# Patient Record
Sex: FEMALE | Race: WHITE | Employment: UNEMPLOYED | URBAN - METROPOLITAN AREA
[De-identification: names, ages, dates, MRNs, and addresses within clinical notes are randomized per-mention and may not be internally consistent; named-entity substitution may affect disease eponyms.]

---

## 2020-11-17 ENCOUNTER — HOSPITAL ENCOUNTER (OUTPATIENT)
Dept: PHYSICAL THERAPY | Age: 70
Setting detail: THERAPIES SERIES
Discharge: HOME | End: 2020-11-17
Payer: MEDICARE

## 2020-11-17 PROCEDURE — 20560 NDL INSJ W/O NJX 1 OR 2 MUSC: CPT

## 2020-11-17 PROCEDURE — 97530 THERAPEUTIC ACTIVITIES: CPT

## 2020-11-17 PROCEDURE — 97110 THERAPEUTIC EXERCISES: CPT

## 2020-11-17 PROCEDURE — 97162 PT EVAL MOD COMPLEX 30 MIN: CPT

## 2020-11-17 ASSESSMENT — PAIN - FUNCTIONAL ASSESSMENT: PAIN_FUNCTIONAL_ASSESSMENT: PREVENTS OR INTERFERES SOME ACTIVE ACTIVITIES AND ADLS

## 2020-11-17 ASSESSMENT — PAIN DESCRIPTION - FREQUENCY: FREQUENCY: CONTINUOUS

## 2020-11-17 ASSESSMENT — PAIN DESCRIPTION - PAIN TYPE: TYPE: CHRONIC PAIN;ACUTE PAIN

## 2020-11-17 ASSESSMENT — PAIN DESCRIPTION - PROGRESSION: CLINICAL_PROGRESSION: GRADUALLY IMPROVING

## 2020-11-17 ASSESSMENT — PAIN SCALES - GENERAL: PAINLEVEL_OUTOF10: 4

## 2020-11-17 ASSESSMENT — PAIN DESCRIPTION - LOCATION: LOCATION: BACK

## 2020-11-17 ASSESSMENT — PAIN DESCRIPTION - ORIENTATION: ORIENTATION: LEFT

## 2020-11-17 ASSESSMENT — PAIN DESCRIPTION - DESCRIPTORS: DESCRIPTORS: RADIATING;SHARP;BURNING

## 2020-11-17 NOTE — PROGRESS NOTES
Physical Therapy  Initial Assessment  Date: 2020  Patient Name: Prince Jansen  MRN: [id number]  : 1950     Treatment Diagnosis: radicular pain left low back to ankle, palpable tenderness left piriformis, impaired daily function, LLE weakness    Restrictions  Position Activity Restriction  Other position/activity restrictions: No formal restrictions    Subjective   General  Chart Reviewed: Yes  Patient assessed for rehabilitation services?: Yes  Additional Pertinent Hx: PMH:  R RC repair, R breast CA w/mastectomy, OA, HLP, L ankle fx w/fixation, chronic back pain  Family / Caregiver Present: Yes  Referring Practitioner: Milind Adair (Dr. Maria Hogan)  Diagnosis: Sciatica  Follows Commands: Within Functional Limits  PT Visit Information  Onset Date: (3-4 weeks acute pain; hx sciatica)  PT Insurance Information: [facility] Medicare  Total # of Visits Approved: (BOMN)  Subjective  Subjective: Pt reports acute on chronic LBP w/significant onset of pain down the left leg to the ankle. X-ray on 20 (+) for DDD of lumbar spine, facet overgrowth mostly in lumbar spine, mild right convex scoliotic curve. PLOF:  Ind. w/mobility and self-care, retired from working at [employer], [email or web address], does laundry and makes meals. Has a service for yard work. Spouse and patient share household duties. Was given a muscle relaxer and Medrol dose pack w/o improvement. She also has been going to have medical massage w/some relief. WORSE:  In the evening, standing to do chores, walking, sleeping (has to sleep on her back), stairs  BETTER:  Heat/cold temporarily  Pain Screening  Patient Currently in Pain: Yes  Pain Assessment  Pain Assessment: 0-10  Pain Level: 4  Pain Type: Chronic pain;Acute pain  Pain Location: Back  Pain Orientation: Left  Pain Radiating Towards: low back to ankle posterolateral leg  Pain Descriptors: Radiating; Sharp;Burning(burning at the ankle)  Pain Frequency: Continuous  Pain Onset: Independent  Quality of Gait: mildly antalgic, no foot drop  Distance: 100 ft x 2     Assessment   Conditions Requiring Skilled Therapeutic Intervention  Body structures, Functions, Activity limitations: Decreased functional mobility ; Decreased strength; Increased pain;Decreased posture  Assessment: Patient is a 65 y.o. female w/DX sciatica. Pt reports acute on chronic LBP w/significant onset of pain down the left leg to the ankle. X-ray on 11/2/20 (+) for DDD of lumbar spine, facet overgrowth mostly in lumbar spine, mild right convex scoliotic curve. PLOF:  Ind. w/mobility and self-care, retired from working at [employer], [employer], does laundry and makes meals. Has a service for yard work. Spouse and patient share household duties. Was given a muscle relaxer and Medrol dose pack w/o improvement. She also has been going to have medical massage w/some relief. WORSE:  In the evening, standing to do chores, walking, sleeping (has to sleep on her back), stairs  BETTER:  Heat/cold temporarily  Treatment Diagnosis: radicular pain left low back to ankle, palpable tenderness left piriformis, impaired daily function, LLE weakness  Prognosis: Good  Decision Making: Medium Complexity  History: PMH:  R RC repair, R breast CA w/mastectomy, OA, HLP, L ankle fx w/fixation, chronic back pain  Exam: MMT, ROM, palpation, gait  Clinical Presentation: Med complexity  Barriers to Learning: None noted  REQUIRES PT FOLLOW UP: Yes         Plan   Plan  Times per week: 2  Plan weeks: 4  Current Treatment Recommendations: Strengthening, Neuromuscular Re-education, Manual Therapy - Soft Tissue Mobilization, Pain Management, Modalities, Patient/Caregiver Education & Training, Home Exercise Program, Integrated Dry Needling         Goals  Long term goals  Time Frame for Long term goals : In 4 weeks, patient will  Long term goal 1: demonstrate compliance and independence w/HEP.   Long term goal 2: score no greater than 20% disability on Oswestry LBP questionnaire as an indication of significant functional improvement w/daily activities. Long term goal 3: report ability to sleep as long as desired through the night w/o waking d/t back/leg pain. Patient Goals   Patient goals : No longer have sciatic pain, be able to sleep.        Therapy Time   Individual Concurrent Group Co-treatment   Time In 1500         Time Out 1600         Minutes 60         Timed Code Treatment Minutes: MiguelaFederico Verma 98, PT

## 2020-11-17 NOTE — FLOWSHEET NOTE
Outpatient Physical Therapy  [place]           [x] Phone: [phone]   Fax: [phone]  [place]           [] Phone: [phone]   Fax: [phone]        Physical Therapy Daily Treatment Note  Date:  2020    Patient Name:  Yeni Lopez    :  1950  MRN: [id number]  Restrictions/Precautions: Other position/activity restrictions: No formal restrictions  Diagnosis:   Diagnosis: Sciatica  Date of Injury/Surgery: acute x 3 wks on chronic  Treatment Diagnosis: Treatment Diagnosis: radicular pain left low back to ankle, palpable tenderness left piriformis, impaired daily function, LLE weakness    Insurance/Certification information: PT Insurance Information: [facility] Medicare   Referring Physician:  Referring Practitioner: Denise Escalona (Dr. Milly Vaz)  Next Doctor Visit:  Unknown   Plan of care signed (Y/N):  Pending  Outcome Measure: OSW  (54% disability)  Visit# / total visits:   1/  Pain level: 4/10 w/symptoms from LLB to left ankle  POC Date Range:  20 - 20   Goals:          Long term goals  Time Frame for Long term goals : In 4 weeks, patient will  Long term goal 1: demonstrate compliance and independence w/HEP. Long term goal 2: score no greater than 20% disability on Oswestry LBP questionnaire as an indication of significant functional improvement w/daily activities. Long term goal 3: report ability to sleep as long as desired through the night w/o waking d/t back/leg pain. Summary of Evaluation: Assessment: Patient is a 65 y.o. female w/DX sciatica. Pt reports acute on chronic LBP w/significant onset of pain down the left leg to the ankle. X-ray on 20 (+) for DDD of lumbar spine, facet overgrowth mostly in lumbar spine, mild right convex scoliotic curve. PLOF:  Ind. w/mobility and self-care, retired from working at [employer], Currently, does laundry and makes meals. Has a service for yard work. Spouse and patient share household duties.   Was given a muscle relaxer and Medrol dose pack w/o improvement. She also has been going to have medical massage w/some relief. WORSE:  In the evening, standing to do chores, walking, sleeping (has to sleep on her back), stairs  BETTER:  Heat/cold temporarily        Subjective:  See eval         Any changes in Ambulatory Summary Sheet? None        Objective:  See eval     Prior to today's treatment session, patient was screened for signs and symptoms related to COVID-19 including but not limited to verbally answering questions related to feeling ill, cough, or SOB, along with taking temperature via forehead thermometer. Patient presented with all negative signs and symptoms and had no fever >100 degrees Fahrenheit this date. Exercises: (No more than 4 columns)   Exercise/Equipment Date 11/17/20 #1 Date Date           WARM UP                     TABLE      Piriformis st 1 x 20 sec  bilat                                STANDING                                                     PROPRIOCEPTION                                    MODALITIES DNT                     Other Therapeutic Activities/Education:    POC and treatment progression expected reviewed with and accepted by patient. DNT Acknowledgement form was provided, reviewed and signed. Pt notified that mild needle soreness x 1-2 days may be present and is an expected side effect. Mild bruising at needle site(s) may also occur. Patient wished to proceed w/DNT. Home Exercise Program:    TBD    Manual Treatments:    NA    Modalities:    DNT  Right side lying w/2 pillows b/t knees  Left piriformis  0.30 x 40 mm    Communication with other providers:    POC faxed to ordering provider. Assessment:  (Response towards treatment session) (Pain Rating)  Pt tolerated DNT very well. No adverse reactions. \"Burning\" left ankle resolved. Assessment: Patient is a 65 y.o. female w/DX sciatica. Pt reports acute on chronic LBP w/significant onset of pain down the left leg to the ankle. X-ray on 11/2/20 (+) for DDD of lumbar spine, facet overgrowth mostly in lumbar spine, mild right convex scoliotic curve. PLOF:  Ind. w/mobility and self-care, retired from working at [employer], [employer], does laundry and makes meals. Has a service for yard work. Spouse and patient share household duties. Was given a muscle relaxer and Medrol dose pack w/o improvement. She also has been going to have medical massage w/some relief.   WORSE:  In the evening, standing to do chores, walking, sleeping (has to sleep on her back), stairs  BETTER:  Heat/cold temporarily      Plan for Next Session:     Lumbar stabilization  Piriformis stretch as gwen  DNT if tolerated and beneficial previous session    Time In / Time Out:     1500/1600    If [facility] Please Indicate Time In/Out  CPT Code Time in Time out                                                              Timed Code/Total Treatment Minutes:  45'/60'  TE 15' (1), ADL 15' (1), DNT 13' (1)      Next Progress Note due:  78/86/29 (Recert VS Discharge)      Plan of Care Interventions:  [x] Therapeutic Exercise  [x] Modalities:  [x] Therapeutic Activity     [x] Ultrasound  [x] Estim  [] Gait Training      [] Cervical Traction [x] Lumbar Traction  [x] Neuromuscular Re-education    [x] Cold/hotpack [] Iontophoresis   [x] Instruction in HEP      [] Vasopneumatic   [x] Dry Needling    [x] Manual Therapy               [] Aquatic Therapy              Electronically signed by:  Joy Dang, PT 11/17/2020, 6:27 PM

## 2020-11-19 ENCOUNTER — HOSPITAL ENCOUNTER (OUTPATIENT)
Dept: PHYSICAL THERAPY | Age: 70
Setting detail: THERAPIES SERIES
Discharge: HOME | End: 2020-11-19
Payer: MEDICARE

## 2020-11-19 PROCEDURE — 20561 NDL INSJ W/O NJX 3+ MUSC: CPT

## 2020-11-19 NOTE — FLOWSHEET NOTE
Outpatient Physical Therapy  Center           [x] Phone: [phone]   Fax: [phone]  [place]           [] Phone: [phone]   Fax: [phone]        Physical Therapy Daily Treatment Note  Date:  2020    Patient Name:  Chaz Luong    :  1950  MRN: [id number]  Restrictions/Precautions: Other position/activity restrictions: No formal restrictions  Diagnosis:   Diagnosis: Sciatica  Date of Injury/Surgery: acute x 3 wks on chronic  Treatment Diagnosis: Treatment Diagnosis: radicular pain left low back to ankle, palpable tenderness left piriformis, impaired daily function, LLE weakness    Insurance/Certification information: PT Insurance Information: [place] Medicare   Referring Physician:  Referring Practitioner: Flora Guillen (Dr. Tracy Allison)  Next Doctor Visit:  Unknown   Plan of care signed (Y/N):  Pending  Outcome Measure: OSW  (54% disability)  Visit# / total visits:   2/  Pain level: 8/10 left upper buttock, 0/10 low back, 8/10 w/symptoms    POC Date Range:  20 - 20     Goals:          Long term goals  Time Frame for Long term goals : In 4 weeks, patient will  Long term goal 1: demonstrate compliance and independence w/HEP. Long term goal 2: score no greater than 20% disability on Oswestry LBP questionnaire as an indication of significant functional improvement w/daily activities. Long term goal 3: report ability to sleep as long as desired through the night w/o waking d/t back/leg pain. Summary of Evaluation: Assessment: Patient is a 65 y.o. female w/DX sciatica. Pt reports acute on chronic LBP w/significant onset of pain down the left leg to the ankle. X-ray on 20 (+) for DDD of lumbar spine, facet overgrowth mostly in lumbar spine, mild right convex scoliotic curve. PLOF:  Ind. w/mobility and self-care, retired from working at [employer], [employer], does laundry and makes meals. Has a service for yard work.   Spouse and patient share household duties. Was given a muscle relaxer and Medrol dose pack w/o improvement. She also has been going to have medical massage w/some relief. WORSE:  In the evening, standing to do chores, walking, sleeping (has to sleep on her back), stairs  BETTER:  Heat/cold temporarily        Subjective: After last treatment, left ankle pain/burning has not been as intense. Today, however, symptoms have increased. Patient states she was very busy w/housework yesterday w/bending to clean toilets/tubs and laundry. [facility] would like to repeat the dry needling. Any changes in Ambulatory Summary Sheet? None        Objective:     Gait and transfers non-antalgic w/o AD. TTP L lumbar paraspinals, L piriformis, gluteus medius, distal gluteus max, superior hamstrings (biceps femoris)  Radicular symptoms from left lumbar paraspinals to left lateral ankle. Prior to today's treatment session, patient was screened for signs and symptoms related to COVID-19 including but not limited to verbally answering questions related to feeling ill, cough, or SOB, along with taking temperature via forehead thermometer. Patient presented with all negative signs and symptoms and had no fever >100 degrees Fahrenheit this date. Exercises: (No more than 4 columns)   Exercise/Equipment Date 11/17/20 #1 Date 11/19/20 #2 Date           WARM UP                     TABLE      Piriformis st 1 x 20 sec  bilat                                STANDING                                                     PROPRIOCEPTION                                    MODALITIES DNT DNT                    Other Therapeutic Activities/Education:    POC and treatment progression expected reviewed with and accepted by patient. DNT Acknowledgement form was provided, reviewed and signed. Pt notified that mild needle soreness x 1-2 days may be present and is an expected side effect. Mild bruising at needle site(s) may also occur.   Patient wished to proceed w/DNT. Home Exercise Program:    TBD    Manual Treatments:    NA    Modalities:    11/19/20:  DNT  Right side lying w/2 pillows b/t knees    Left lumbar paraspinals  L3, L4, L5, S1  0.25 x 30 mm    Left piriformis  0.30 x 40 mm    Left lateral lower leg (peroneal)  0.25 x 30 mm    Communication with other providers:    POC faxed to ordering provider. Assessment:  (Response towards treatment session) (Pain Rating)  Pt tolerated DNT very well. No adverse reactions. Needle soreness reported. Gait non-antalgic    Assessment: Patient is a 65 y.o. female w/DX sciatica. Pt reports acute on chronic LBP w/significant onset of pain down the left leg to the ankle. X-ray on 11/2/20 (+) for DDD of lumbar spine, facet overgrowth mostly in lumbar spine, mild right convex scoliotic curve. PLOF:  Ind. w/mobility and self-care, retired from working at [employer], [employer], does laundry and makes meals. Has a service for yard work. Spouse and patient share household duties. Was given a muscle relaxer and Medrol dose pack w/o improvement. She also has been going to have medical massage w/some relief.   WORSE:  In the evening, standing to do chores, walking, sleeping (has to sleep on her back), stairs  BETTER:  Heat/cold temporarily      Plan for Next Session:     Lumbar stabilization  Piriformis stretch as gwen  DNT if tolerated and beneficial previous session    Time In / Time Out:     1200/1240    If [facility] Please Indicate Time In/Out  CPT Code Time in Time out                                                              Timed Code/Total Treatment Minutes:  40'/40'   DNT 40' (1)      Next Progress Note due:  89/56/44 (Recert VS Discharge)      Plan of Care Interventions:  [x] Therapeutic Exercise  [x] Modalities:  [x] Therapeutic Activity     [x] Ultrasound  [x] Estim  [] Gait Training      [] Cervical Traction [x] Lumbar Traction  [x] Neuromuscular Re-education    [x] Cold/hotpack [] Iontophoresis   [x] Instruction in HEP      [] Vasopneumatic   [x] Dry Needling    [x] Manual Therapy               [] Aquatic Therapy              Electronically signed by:  Rekha Richardson PT 11/19/2020, 12:01 PM

## 2020-11-24 ENCOUNTER — HOSPITAL ENCOUNTER (OUTPATIENT)
Dept: PHYSICAL THERAPY | Age: 70
Setting detail: THERAPIES SERIES
Discharge: HOME | End: 2020-11-24
Payer: MEDICARE

## 2020-11-24 PROCEDURE — 20561 NDL INSJ W/O NJX 3+ MUSC: CPT

## 2020-12-01 ENCOUNTER — HOSPITAL ENCOUNTER (OUTPATIENT)
Dept: PHYSICAL THERAPY | Age: 70
Setting detail: THERAPIES SERIES
Discharge: HOME | End: 2020-12-01
Payer: MEDICARE

## 2020-12-01 PROCEDURE — 97530 THERAPEUTIC ACTIVITIES: CPT

## 2020-12-01 PROCEDURE — 97112 NEUROMUSCULAR REEDUCATION: CPT

## 2020-12-01 PROCEDURE — 97110 THERAPEUTIC EXERCISES: CPT

## 2020-12-01 PROCEDURE — 97140 MANUAL THERAPY 1/> REGIONS: CPT

## 2020-12-01 NOTE — FLOWSHEET NOTE
[facility]  [facility]           [x] Phone: [phone]   Fax: [phone]  [place]           [] Phone: [phone]   Fax: [phone]        Physical Therapy Daily Treatment Note  Date:  2020    Patient Name:  Gunjan Parker    :  1950  MRN: [id number]  Restrictions/Precautions: Other position/activity restrictions: No formal restrictions  Diagnosis:   Diagnosis: Sciatica  Date of Injury/Surgery: acute x 3 wks on chronic  Treatment Diagnosis: Treatment Diagnosis: radicular pain left low back to ankle, palpable tenderness left piriformis, impaired daily function, LLE weakness    Insurance/Certification information: PT Insurance Information: [place] [facility]   Referring Physician:  Referring Practitioner: Jossue David (Dr. Kamryn Mock)  Next Doctor Visit:  Unknown   Plan of care signed (Y/N):  Pending  Outcome Measure: OSW  (54% disability)  Visit# / total visits:   4/  Pain level: 5/10 left upper buttock, 5/10 L low back, 5/10 left lateral ankle    POC Date Range:  20 - 20     Goals:          Long term goals  Time Frame for Long term goals : In 4 weeks, patient will  Long term goal 1: demonstrate compliance and independence w/HEP. Long term goal 2: score no greater than 20% disability on Oswestry LBP questionnaire as an indication of significant functional improvement w/daily activities. Long term goal 3: report ability to sleep as long as desired through the night w/o waking d/t back/leg pain. Summary of Evaluation: Assessment: Patient is a 65 y.o. female w/DX sciatica. Pt reports acute on chronic LBP w/significant onset of pain down the left leg to the ankle. X-ray on 20 (+) for DDD of lumbar spine, facet overgrowth mostly in lumbar spine, mild right convex scoliotic curve. PLOF:  Ind. w/mobility and self-care, retired from working at [employer], [employer], does laundry and makes meals. Has a service for yard work.   Spouse and patient share household duties. Was given a muscle relaxer and Medrol dose pack w/o improvement. She also has been going to have medical massage w/some relief. WORSE:  In the evening, standing to do chores, walking, sleeping (has to sleep on her back), stairs  BETTER:  Heat/cold temporarily        Subjective: Pt stated she went on a 1 mile walk yesterday and walk [place] mall. Her back was okay. Any changes in Ambulatory Summary Sheet? None        Objective: At arrival, antalgic and R LE glut weakness w trendelenburg like gait  Decreased tolerance to R LE SL activities  Difficulty describing intensity, rating pain and location when asked. Generalized 5/10   7/10 SL L LE  L posterior pelvic rotation with L LE post standing activity  R LB pain with SL WB  Challenged by side abduction and side stepping with weakness and pain         Prior to today's treatment session, patient was screened for signs and symptoms related to COVID-19 including but not limited to verbally answering questions related to feeling ill, cough, or SOB, along with taking temperature via forehead thermometer. Patient presented with all negative signs and symptoms and had no fever >100 degrees Fahrenheit this date. Exercises: (No more than 4 columns)   Exercise/Equipment Date 11/17/20 #1 Date 11/19/20 #2 Date 11/24/20 #3 12/1/20 #4            WARM UP          Nustep    lv 4 6'   S/A 10          TABLE       Piriformis st 1 x 20 sec  bilat      PPT    X 15 2 ct   DKTC w SB    X 10   SKTC w TA contraction    X 15          Bridges with add ball    X 10   Clams #1    X 10   X 10 RTB                    STANDING              abduction    2 x 5 ea.   Standing on L LE increased pain to 7/10           STS    X 10  Arms crossed                              PROPRIOCEPTION       airex marches       airex side stepping                            MODALITIES DNT DNT DNT                      Other Therapeutic Activities/Education:      Home Exercise Program:    12/1/20 TA, sup amandeep, hector #1, bridges, STS HO Given    Manual Treatments:    12/1/20 MET pelvic rotation correction x 2: before standing exercises with positive decrease in pain and after standing exercises with generalized LBP with muscle fatigue. Modalities:    11/24/20:  DNT  Right side lying w/2 pillows b/t knees    Left lumbar paraspinals  L3, L4, L5, S1  0.25 x 30 mm    Left piriformis  0.30 x 40 mm    Left lateral lower leg (peroneal)  0.25 x 30 mm    Communication with other providers:    POC faxed to ordering provider. Assessment:  (Response towards treatment session) (Pain Rating)  Pt demonstrated GOOD tolerance to mat exercises and FAIR tolerance to standing exercises especially L LE SL WB with noticeable hip drop. Pt would continue to benefit from skilled therapy interventions to address remaining impairments, improve mobility and strength and progress toward goal completion while reducing risk for re-injury or further decline. Pain: same, 5/10, LB/sacral area but feeling better and possible LB fatigue    Assessment: Patient is a 65 y.o. female w/DX sciatica. Pt reports acute on chronic LBP w/significant onset of pain down the left leg to the ankle. X-ray on 11/2/20 (+) for DDD of lumbar spine, facet overgrowth mostly in lumbar spine, mild right convex scoliotic curve. PLOF:  Ind. w/mobility and self-care, retired from working at [employer], [employer], does laundry and makes meals. Has a service for yard work. Spouse and patient share household duties. Was given a muscle relaxer and Medrol dose pack w/o improvement. She also has been going to have medical massage w/some relief.   WORSE:  In the evening, standing to do chores, walking, sleeping (has to sleep on her back), stairs  BETTER:  Heat/cold temporarily      Plan for Next Session:     Lumbar stabilization  Hip strengthening  Piriformis stretch as gwen      Time In / Time Out:     1210/12    Timed Code/Total Treatment Minutes:  57'/ 20 TE 17 NR 10 MT 10 TA      Next Progress Note due:  11/69/13 (Recert VS Discharge)      Plan of Care Interventions:  [x] Therapeutic Exercise  [x] Modalities:  [x] Therapeutic Activity     [x] Ultrasound  [x] Estim  [] Gait Training      [] Cervical Traction [x] Lumbar Traction  [x] Neuromuscular Re-education    [x] Cold/hotpack [] Iontophoresis   [x] Instruction in HEP      [] Vasopneumatic   [x] Dry Needling    [x] Manual Therapy               [] Aquatic Therapy              Electronically signed by:  Angélica Caal PTA, CLT 12/1/2020, 1:05 PM

## 2020-12-03 ENCOUNTER — HOSPITAL ENCOUNTER (OUTPATIENT)
Dept: PHYSICAL THERAPY | Age: 70
Setting detail: THERAPIES SERIES
Discharge: HOME | End: 2020-12-03
Payer: MEDICARE

## 2020-12-03 PROCEDURE — 97112 NEUROMUSCULAR REEDUCATION: CPT

## 2020-12-03 PROCEDURE — 97110 THERAPEUTIC EXERCISES: CPT

## 2020-12-03 PROCEDURE — 97530 THERAPEUTIC ACTIVITIES: CPT

## 2020-12-03 NOTE — FLOWSHEET NOTE
Outpatient Physical Therapy  [place]           [x] Phone: [phone]   Fax: [phone]  [facility]           [] Phone: [phone]   Fax: [phone]        Physical Therapy Daily Treatment Note  Date:  12/3/2020    Patient Name:  Joel Mart    :  1950  MRN: [id number]  Restrictions/Precautions: Other position/activity restrictions: No formal restrictions  Diagnosis:   Diagnosis: Sciatica  Date of Injury/Surgery: acute x 3 wks on chronic  Treatment Diagnosis: Treatment Diagnosis: radicular pain left low back to ankle, palpable tenderness left piriformis, impaired daily function, LLE weakness    Insurance/Certification information: PT Insurance Information: [facility] Medicare   Referring Physician:  Referring Practitioner: Kerline Oliveira (Dr. Bailey Garcia)  Next Doctor Visit:  Unknown   Plan of care signed (Y/N):  Pending  Outcome Measure: OSW  (54% disability)  Visit# / total visits:   5/  Pain level: 2/10 currently  Last night>>5/10 left upper buttock, 5/10 L low back, 5/10 left lateral ankle    POC Date Range:  20 - 20     Goals:          Long term goals  Time Frame for Long term goals : In 4 weeks, patient will  Long term goal 1: demonstrate compliance and independence w/HEP. Long term goal 2: score no greater than 20% disability on Oswestry LBP questionnaire as an indication of significant functional improvement w/daily activities. Long term goal 3: report ability to sleep as long as desired through the night w/o waking d/t back/leg pain. Summary of Evaluation: Assessment: Patient is a 65 y.o. female w/DX sciatica. Pt reports acute on chronic LBP w/significant onset of pain down the left leg to the ankle. X-ray on 20 (+) for DDD of lumbar spine, facet overgrowth mostly in lumbar spine, mild right convex scoliotic curve. PLOF:  Ind. w/mobility and self-care, retired from working at [employer], [employer], does laundry and makes meals.    Has a service for yard work.  Spouse and patient share household duties. Was given a muscle relaxer and Medrol dose pack w/o improvement. She also has been going to have medical massage w/some relief. WORSE:  In the evening, standing to do chores, walking, sleeping (has to sleep on her back), stairs  BETTER:  Heat/cold temporarily        Subjective: Pt stated she went grocery shopping and followed with shopping. Came home and cooked dinner and did laundry. Total 4-5 hours    Any changes in Ambulatory Summary Sheet? None      Objective: At arrival, mild antalgic 2/10   Decreased tolerance to L LE SL activities but improved since last visit  5/10 at end of long day walking. No pelvic rotation today  Mild R LB pain with SL WB  Challenged by side abduction and side stepping with weakness on L side; trendelenburg sx    Prior to today's treatment session, patient was screened for signs and symptoms related to COVID-19 including but not limited to verbally answering questions related to feeling ill, cough, or SOB, along with taking temperature via forehead thermometer. Patient presented with all negative signs and symptoms and had no fever >100 degrees Fahrenheit this date. Exercises: (No more than 4 columns)   Exercise/Equipment Date 11/17/20 #1 Date 11/19/20 #2 Date 11/24/20 #3 12/1/20 #4 12/3/20 #5             WARM UP           Nustep    lv 4 6'   S/A 10            TABLE        Piriformis st 1 x 20 sec  bilat       PPT    X 15 2 ct X 20 5 ct   DKTC w SB    X 10 X 20   SKTC w TA contraction    X 15 X 15           Bridges with add ball    X 10 X 15   Clams #1    X 10   X 10 RTB X 10 GTB ea. STANDING                abduction    2 x 5 ea. Standing on L LE increased pain to 7/10  2 x 5 ea.   Standing on L LE increased            STS    X 10  Arms crossed  X 15  Arms crossed                                 PROPRIOCEPTION        airex marches     X 10   side stepping     X 10   Wobble board f/b s/s     Tap x 10 ea.  and balancing 30 s x 2                   MODALITIES DNT DNT DNT  x                       Other Therapeutic Activities/Education:      Home Exercise Program:    12/1/20 TA, sup marches, clams #1, bridges, STS HO Given    Manual Treatments:        Modalities:        Communication with other providers:    POC faxed to ordering provider. Assessment:  (Response towards treatment session) (Pain Rating)  Pt demonstrated GOOD tolerance to mat exercises and  GOOD- tolerance to standing exercises. L LE SL WB with noticeable hip drop with less pain today. Pt would continue to benefit from skilled therapy interventions to address remaining impairments, improve mobility and strength and progress toward goal completion while reducing risk for re-injury or further decline. Pain: 2/10     Assessment: Patient is a 65 y.o. female w/DX sciatica. Pt reports acute on chronic LBP w/significant onset of pain down the left leg to the ankle. X-ray on 11/2/20 (+) for DDD of lumbar spine, facet overgrowth mostly in lumbar spine, mild right convex scoliotic curve. PLOF:  Ind. w/mobility and self-care, retired from working at [employer], [employer], does laundry and makes meals. Has a service for yard work. Spouse and patient share household duties. Was given a muscle relaxer and Medrol dose pack w/o improvement. She also has been going to have medical massage w/some relief.   WORSE:  In the evening, standing to do chores, walking, sleeping (has to sleep on her back), stairs  BETTER:  Heat/cold temporarily      Plan for Next Session:     Lumbar stabilization  Hip strengthening  Piriformis stretch as gwen      Time In / Time Out:     1119/1157    Timed Code/Total Treatment Minutes:  38'/ 15 TE 15 NR 8 TA      Next Progress Note due:  31/41/03 (Recert VS Discharge)      Plan of Care Interventions:  [x] Therapeutic Exercise  [x] Modalities:  [x] Therapeutic Activity     [x] Ultrasound  [x] Estim  [] Gait Training [] Cervical Traction [x] Lumbar Traction  [x] Neuromuscular Re-education    [x] Cold/hotpack [] Iontophoresis   [x] Instruction in HEP      [] Vasopneumatic   [x] Dry Needling    [x] Manual Therapy               [] Aquatic Therapy              Electronically signed by:  Ivonne Greene PTA, CLT 12/3/2020, 11:22 AM

## 2020-12-08 ENCOUNTER — HOSPITAL ENCOUNTER (OUTPATIENT)
Dept: PHYSICAL THERAPY | Age: 70
Setting detail: THERAPIES SERIES
Discharge: HOME | End: 2020-12-08
Payer: MEDICARE

## 2020-12-08 PROCEDURE — 97530 THERAPEUTIC ACTIVITIES: CPT

## 2020-12-08 PROCEDURE — 97110 THERAPEUTIC EXERCISES: CPT

## 2020-12-08 PROCEDURE — 97112 NEUROMUSCULAR REEDUCATION: CPT

## 2020-12-08 NOTE — FLOWSHEET NOTE
Outpatient Physical Therapy  [place]           [x] Phone: [phone]   Fax: [phone]  [facility]           [] Phone: [phone]   Fax: [phone]        Physical Therapy Daily Treatment Note  Date:  2020    Patient Name:  Jerardo Bishop    :  1950  MRN: [id number]  Restrictions/Precautions: Other position/activity restrictions: No formal restrictions  Diagnosis:   Diagnosis: Sciatica  Date of Injury/Surgery: acute x 3 wks on chronic  Treatment Diagnosis: Treatment Diagnosis: radicular pain left low back to ankle, palpable tenderness left piriformis, impaired daily function, LLE weakness    Insurance/Certification information: PT Insurance Information: [facility] Medicare   Referring Physician:  Referring Practitioner: Domenica Álvarez (Dr. Kierra Manzanares)  Next Doctor Visit:  Unknown   Plan of care signed (Y/N):  Pending  Outcome Measure: OSW  (54% disability)  Visit# / total visits:   6/  Pain level: 0/10 currently     POC Date Range:  20 - 20     Goals:          Long term goals  Time Frame for Long term goals : In 4 weeks, patient will  Long term goal 1: demonstrate compliance and independence w/HEP. Long term goal 2: score no greater than 20% disability on Oswestry LBP questionnaire as an indication of significant functional improvement w/daily activities. Long term goal 3: report ability to sleep as long as desired through the night w/o waking d/t back/leg pain. Summary of Evaluation: Assessment: Patient is a 65 y.o. female w/DX sciatica. Pt reports acute on chronic LBP w/significant onset of pain down the left leg to the ankle. X-ray on 20 (+) for DDD of lumbar spine, facet overgrowth mostly in lumbar spine, mild right convex scoliotic curve. PLOF:  Ind. w/mobility and self-care, retired from working at [employer], [employer], does laundry and makes meals. Has a service for yard work. Spouse and patient share household duties.   Was given a muscle relaxer and Medrol dose pack w/o improvement. She also has been going to have medical massage w/some relief. WORSE:  In the evening, standing to do chores, walking, sleeping (has to sleep on her back), stairs  BETTER:  Heat/cold temporarily        Subjective: Pt stated she did get up from a chair and felt a pinch in the back for a second but it didn't stay. Currently she has no pain. She did have have a tooth pulled yesterday. She went shopping Saturday 3-4/10    Any changes in Ambulatory Summary Sheet? None      Objective: Increased tolerance to SLS activities  5/10 at end of walking/shopping. L posterior pelvic rotation today. Corrected with MET  No c/o pain  Challenged by side abduction and side stepping the most and balance board with weakness on L>R side    Prior to today's treatment session, patient was screened for signs and symptoms related to COVID-19 including but not limited to verbally answering questions related to feeling ill, cough, or SOB, along with taking temperature via forehead thermometer. Patient presented with all negative signs and symptoms and had no fever >100 degrees Fahrenheit this date. Exercises: (No more than 4 columns)   Exercise/Equipment 12/1/20 #4 12/3/20 #5 12/8/20 #6           WARM UP         Nustep lv 4 6'   S/A 10  lv 4 7'   S/A 10         TABLE      Piriformis st      PPT X 15 2 ct X 20 5 ct X 20 5 ct   DKTC w SB X 10 X 20 X 20   SKTC w TA contraction X 15 X 15 X 20         Bridges with add ball X 10 X 15 X 15   Clams #1 X 10   X 10 RTB X 10 GTB ea. STANDING            abduction 2 x 5 ea. Standing on L LE increased pain to 7/10  2 x 5 ea. Standing on L LE increased  2 x 5 ea. Standing on L LE increased          STS X 10  Arms crossed  X 15  Arms crossed  X 15  Arms crossed                           PROPRIOCEPTION      airex marches  X 10 X 10   side stepping  X 10 X 10   Wobble board f/b s/s  Tap x 10 ea. and balancing 30 s x 2 Tap x 10 ea.   and balancing 30 s x 2               MODALITIES  x x                   Other Therapeutic Activities/Education:      Home Exercise Program:    12/1/20 TA, sup marches, clams #1, bridges, STS HO Given    Manual Treatments:    12/8/20 MET pelvic rotation correction x 2: before standing exercises with positive decrease in pain and after standing exercises with generalized LBP with muscle fatigue. Modalities:  x    Communication with other providers:    POC faxed to ordering provider. Assessment:  (Response towards treatment session) (Pain Rating)  Pt demonstrated GOOD tolerance to standing exercises. L LE SL WB with mild hip drop and instability with no pain today. Mild increased in abdominal control. Pt would continue to benefit from skilled therapy interventions to address remaining impairments, improve mobility and strength and progress toward goal completion while reducing risk for re-injury or further decline. Pain: 0/10 mild fatigue     Assessment: Patient is a 65 y.o. female w/DX sciatica. Pt reports acute on chronic LBP w/significant onset of pain down the left leg to the ankle. X-ray on 11/2/20 (+) for DDD of lumbar spine, facet overgrowth mostly in lumbar spine, mild right convex scoliotic curve. PLOF:  Ind. w/mobility and self-care, retired from working at [employer], [employer], does laundry and makes meals. Has a service for yard work. Spouse and patient share household duties. Was given a muscle relaxer and Medrol dose pack w/o improvement. She also has been going to have medical massage w/some relief.   WORSE:  In the evening, standing to do chores, walking, sleeping (has to sleep on her back), stairs  BETTER:  Heat/cold temporarily      Plan for Next Session:     Lumbar stabilization  Hip strengthening  Piriformis stretch as gwen      Time In / Time Out:     1131 (late)/1209    Timed Code/Total Treatment Minutes:  38'/ 15 TE 15 NR 8 TA      Next Progress Note due:  31/08/28 (Recert

## 2020-12-10 ENCOUNTER — HOSPITAL ENCOUNTER (OUTPATIENT)
Dept: PHYSICAL THERAPY | Age: 70
Setting detail: THERAPIES SERIES
Discharge: HOME | End: 2020-12-10
Payer: MEDICARE

## 2020-12-10 PROCEDURE — 97110 THERAPEUTIC EXERCISES: CPT

## 2020-12-10 PROCEDURE — 97140 MANUAL THERAPY 1/> REGIONS: CPT

## 2020-12-10 NOTE — FLOWSHEET NOTE
Outpatient Physical Therapy  [place]           [x] Phone: [phone]   Fax: [phone]  [place]           [] Phone: [phone]   Fax: [phone]        Physical Therapy Daily Treatment Note  Date:  12/10/2020    Patient Name:  Andres Norman    :  1950  MRN: [id number]  Restrictions/Precautions: Other position/activity restrictions: No formal restrictions  Diagnosis:   Diagnosis: Sciatica  Date of Injury/Surgery: acute x 3 wks on chronic  Treatment Diagnosis: Treatment Diagnosis: radicular pain left low back to ankle, palpable tenderness left piriformis, impaired daily function, LLE weakness    Insurance/Certification information: PT Insurance Information: [facility] Medicare   Referring Physician:  Referring Practitioner: Nicholas Bagley (Dr. Lisa Lira)  Next Doctor Visit:  Unknown   Plan of care signed (Y/N):  Pending  Outcome Measure: OSW  (54% disability); OSW  (10% disability)  Visit# / total visits:   7/  Pain level: 0/10 currently    POC Date Range:  20 - 20     Goals:          Long term goals  Time Frame for Long term goals : In 4 weeks, patient will  Long term goal 1: demonstrate compliance and independence w/HEP. - ONGOING/MET 12/10/20  Long term goal 2: score no greater than 20% disability on Oswestry LBP questionnaire as an indication of significant functional improvement w/daily activities. Long term goal 3: report ability to sleep as long as desired through the night w/o waking d/t back/leg pain. Summary of Evaluation: Assessment: Patient is a 65 y.o. female w/DX sciatica. Pt reports acute on chronic LBP w/significant onset of pain down the left leg to the ankle. X-ray on 20 (+) for DDD of lumbar spine, facet overgrowth mostly in lumbar spine, mild right convex scoliotic curve. PLOF:  Ind. w/mobility and self-care, retired from working at [employer], [employer], does laundry and makes meals. Has a service for yard work.   Spouse and patient share household duties. Was given a muscle relaxer and Medrol dose pack w/o improvement. She also has been going to have medical massage w/some relief. WORSE:  In the evening, standing to do chores, walking, sleeping (has to sleep on her back), stairs  BETTER:  Heat/cold temporarily        Subjective: Overall, feeling better. HEP helps decrease pain. This morning, L SI pain 1-2/10; after exercise 0/10. L ankle pain has also lessened. Any changes in Ambulatory Summary Sheet? None      Objective: Increased tolerance to SLS activities  5/10 at end of walking/shopping. L posterior pelvic rotation today. Corrected with MET  No c/o pain  Challenged by side abduction and side stepping the most and balance board with weakness on L>R side    12/10/20:  0/10 back and ankle pain. Supine: L posterior pelvic rotation. Unable to correct w/MET  Supine: L ASIS higher  Standing:  L iliac crest lower than R  In supine, left upper body also in posterior rotation (most likely d/t scoliosis)  SLS R = x 20 sec w/mild left hip drop w/\"soreness\" noted left low back/SIJ  SLS L = x 20 sec level pelvis  LLE length = 90 cm  RLE length = 89.5 cm  No radicular pain    Prior to today's treatment session, patient was screened for signs and symptoms related to COVID-19 including but not limited to verbally answering questions related to feeling ill, cough, or SOB, along with taking temperature via forehead thermometer. Patient presented with all negative signs and symptoms and had no fever >100 degrees Fahrenheit this date. Exercises: (No more than 4 columns)   Exercise/Equipment 12/8/20 #6 12/10/20 #7          WARM UP        Nustep lv 4 7'   S/A 10         TABLE     Piriformis st     PPT X 20 5 ct X 20 5 ct   DKTC w SB X 20    SKTC w TA contraction X 20         Bridges with add ball X 15 X 15   Clams #1                  STANDING          abduction 2 x 5 ea.   Standing on L LE increased          STS X 15  Arms crossed  X 15 arms crossed to dropped to side                       PROPRIOCEPTION     airex marches X 10 X 10  No UE support   side stepping X 10 X 10  No UE support   Wobble board f/b s/s Tap x 10 ea. and balancing 30 s x 2              MODALITIES x                  Other Therapeutic Activities/Education:      Home Exercise Program:    12/1/20 TA, sup marches, clams #1, bridges, STS HO Given    Manual Treatments:    12/10/20 MET pelvic rotation correction x 2: before standing exercises to correct posterior innominate on the L; no change noted. Modalities:  x    Communication with other providers:    POC faxed to ordering provider. Assessment:  (Response towards treatment session) (Pain Rating)  Pt demonstrated GOOD tolerance to standing exercises. L LE SL WB with mild hip drop and instability with no pain today. Mild increased in abdominal control. Pt would continue to benefit from skilled therapy interventions to address remaining impairments, improve mobility and strength and progress toward goal completion while reducing risk for re-injury or further decline. Pain: 0/10 mild fatigue     Assessment: Patient is a 65 y.o. female w/DX sciatica. Pt reports acute on chronic LBP w/significant onset of pain down the left leg to the ankle. X-ray on 11/2/20 (+) for DDD of lumbar spine, facet overgrowth mostly in lumbar spine, mild right convex scoliotic curve. PLOF:  Ind. w/mobility and self-care, retired from working at [employer], [employer], does laundry and makes meals. Has a service for yard work. Spouse and patient share household duties. Was given a muscle relaxer and Medrol dose pack w/o improvement. She also has been going to have medical massage w/some relief.   WORSE:  In the evening, standing to do chores, walking, sleeping (has to sleep on her back), stairs  BETTER:  Heat/cold temporarily      Plan for Next Session:     Lumbar stabilization  Hip strengthening    Time In / Time Out: 7526/7436    Timed Code/Total Treatment Minutes:  45'/45'  TE 35' (2), MT 10' (1)      Next Progress Note due:  12/18/84 (Recert VS Discharge)      Plan of Care Interventions:  [x] Therapeutic Exercise  [x] Modalities:  [x] Therapeutic Activity     [x] Ultrasound  [x] Estim  [] Gait Training      [] Cervical Traction [x] Lumbar Traction  [x] Neuromuscular Re-education    [x] Cold/hotpack [] Iontophoresis   [x] Instruction in HEP      [] Vasopneumatic   [x] Dry Needling    [x] Manual Therapy               [] Aquatic Therapy              Electronically signed by:  Oswaldo Mckeon, PT 12/10/2020, 10:25 AM

## 2020-12-30 ENCOUNTER — HOSPITAL ENCOUNTER (OUTPATIENT)
Dept: PHYSICAL THERAPY | Age: 70
Setting detail: THERAPIES SERIES
Discharge: HOME | End: 2020-12-30
Payer: MEDICARE

## 2020-12-30 PROCEDURE — 97110 THERAPEUTIC EXERCISES: CPT

## 2020-12-30 PROCEDURE — 97530 THERAPEUTIC ACTIVITIES: CPT

## 2020-12-30 NOTE — DISCHARGE SUMMARY
Continue per initial Plan of Care     [x] Patient now discharged     [] Additional visits requested, Please re-certify for additional visits: If we are requesting more visits, we fully anticipate the patient's condition is expected to improve within the treatment timeframe we are requesting. Electronically signed by:  Darvin Homans, PT, 12/30/2020, 6:20 PM    If you have any questions or concerns, please don't hesitate to call.   Thank you for your referral.    Physician Signature:______________________ Date:______ Time: ________  By signing above, therapists plan is approved by physician

## 2020-12-30 NOTE — FLOWSHEET NOTE
Outpatient Physical Therapy  [place]           [x] Phone: [phone]   Fax: [phone]  [place]           [] Phone: [phone]   Fax: [phone]        Physical Therapy Daily Treatment Note  Date:  2020    Patient Name:  Elma Cline    :  1950  MRN: [id number]  Restrictions/Precautions: Other position/activity restrictions: No formal restrictions  Diagnosis:   Diagnosis: Sciatica  Date of Injury/Surgery: acute x 3 wks on chronic  Treatment Diagnosis: Treatment Diagnosis: radicular pain left low back to ankle, palpable tenderness left piriformis, impaired daily function, LLE weakness    Insurance/Certification information: PT Insurance Information: [facility] Medicare   Referring Physician:  Referring Practitioner: [place] Payer (Dr. Donal Collazo)  Next Doctor Visit:  Unknown   Plan of care signed (Y/N):  Pending  Outcome Measure: OSW  (54% disability); OSW  (10% disability);  OSW 20 = 4/50 (8% disability)  Visit# / total visits:   8/  Pain level: 0/10 currently    POC Date Range:  20 - 20 (end date extended to 20 as pt had not been seen since 12/10/20.)     Goals:          Long term goals  Time Frame for Long term goals : In 4 weeks, patient will  Long term goal 1: demonstrate compliance and independence w/HEP. - MET 20  Long term goal 2: score no greater than 20% disability on Oswestry LBP questionnaire as an indication of significant functional improvement w/daily activities. - MET 20  Long term goal 3: report ability to sleep as long as desired through the night w/o waking d/t back/leg pain. - Consistently MET 20    Summary of Evaluation: Assessment: Patient is a 65 y.o. female w/DX sciatica. Pt reports acute on chronic LBP w/significant onset of pain down the left leg to the ankle. X-ray on 20 (+) for DDD of lumbar spine, facet overgrowth mostly in lumbar spine, mild right convex scoliotic curve.   PLOF:  Ind. w/mobility and abduction 2 x 5 ea. Standing on L LE increased            STS X 15  Arms crossed  X 15 arms crossed to dropped to side X 10 arms crossed to dropped to side                          PROPRIOCEPTION      airex marches X 10 X 10  No UE support    side stepping X 10 X 10  No UE support    Wobble board f/b s/s Tap x 10 ea. and balancing 30 s x 2                 MODALITIES x                     Other Therapeutic Activities/Education:   Goals reviewed/all met. Pt verbalized preparedness for discharge. Will continue performing HEP. Home Exercise Program:    12/1/20 TA, sup marches, clams #1, bridges, STS HO Given    Manual Treatments:    No    Modalities:  x    Communication with other providers:    POC faxed to ordering provider. Discharge faxed to ordering provider. Assessment:  (Response towards treatment session) (Pain Rating)  Patient has been seen for 8 treatment session from 11/17/20 - 12/30/21  Patient w/no radicular symptoms and no back pain. Back to normal activity and able to sleep well through the night. Ind. W/HEP. All goals met. DISCHARGE      Assessment: Patient is a 65 y.o. female w/DX sciatica. Pt reports acute on chronic LBP w/significant onset of pain down the left leg to the ankle. X-ray on 11/2/20 (+) for DDD of lumbar spine, facet overgrowth mostly in lumbar spine, mild right convex scoliotic curve. PLOF:  Ind. w/mobility and self-care, retired from working at [employer], [employer], does laundry and makes meals. Has a service for yard work. Spouse and patient share household duties. Was given a muscle relaxer and Medrol dose pack w/o improvement. She also has been going to have medical massage w/some relief.   WORSE:  In the evening, standing to do chores, walking, sleeping (has to sleep on her back), stairs  BETTER:  Heat/cold temporarily      Plan for Next Session:     Discharged    Time In / Time Out:     1400/1440    Timed Code/Total Treatment Minutes:  40'/40'

## 2022-02-10 ENCOUNTER — OFFICE VISIT (OUTPATIENT)
Dept: NEUROLOGY | Age: 72
End: 2022-02-10
Payer: MEDICARE

## 2022-02-10 VITALS
DIASTOLIC BLOOD PRESSURE: 72 MMHG | HEIGHT: 67 IN | OXYGEN SATURATION: 98 % | HEART RATE: 58 BPM | BODY MASS INDEX: 25.9 KG/M2 | WEIGHT: 165 LBS | SYSTOLIC BLOOD PRESSURE: 130 MMHG

## 2022-02-10 DIAGNOSIS — K76.82 HEPATIC ENCEPHALOPATHY: ICD-10-CM

## 2022-02-10 DIAGNOSIS — R42 DIZZINESS: Primary | ICD-10-CM

## 2022-02-10 PROCEDURE — G8484 FLU IMMUNIZE NO ADMIN: HCPCS | Performed by: PSYCHIATRY & NEUROLOGY

## 2022-02-10 PROCEDURE — 4040F PNEUMOC VAC/ADMIN/RCVD: CPT | Performed by: PSYCHIATRY & NEUROLOGY

## 2022-02-10 PROCEDURE — G8427 DOCREV CUR MEDS BY ELIG CLIN: HCPCS | Performed by: PSYCHIATRY & NEUROLOGY

## 2022-02-10 PROCEDURE — 1090F PRES/ABSN URINE INCON ASSESS: CPT | Performed by: PSYCHIATRY & NEUROLOGY

## 2022-02-10 PROCEDURE — 1123F ACP DISCUSS/DSCN MKR DOCD: CPT | Performed by: PSYCHIATRY & NEUROLOGY

## 2022-02-10 PROCEDURE — G8399 PT W/DXA RESULTS DOCUMENT: HCPCS | Performed by: PSYCHIATRY & NEUROLOGY

## 2022-02-10 PROCEDURE — 3017F COLORECTAL CA SCREEN DOC REV: CPT | Performed by: PSYCHIATRY & NEUROLOGY

## 2022-02-10 PROCEDURE — G8417 CALC BMI ABV UP PARAM F/U: HCPCS | Performed by: PSYCHIATRY & NEUROLOGY

## 2022-02-10 PROCEDURE — 4004F PT TOBACCO SCREEN RCVD TLK: CPT | Performed by: PSYCHIATRY & NEUROLOGY

## 2022-02-10 PROCEDURE — 99204 OFFICE O/P NEW MOD 45 MIN: CPT | Performed by: PSYCHIATRY & NEUROLOGY

## 2022-02-10 RX ORDER — DEXLANSOPRAZOLE 60 MG/1
60 CAPSULE, DELAYED RELEASE ORAL DAILY
COMMUNITY

## 2022-02-10 RX ORDER — MULTIVITAMIN WITH IRON
100 TABLET ORAL DAILY
COMMUNITY

## 2022-02-10 RX ORDER — NADOLOL 20 MG/1
20 TABLET ORAL DAILY
COMMUNITY

## 2022-02-10 RX ORDER — LACTULOSE 10 G/15ML
20 SOLUTION ORAL; RECTAL 2 TIMES DAILY
COMMUNITY

## 2022-02-10 RX ORDER — TRIAMTERENE AND HYDROCHLOROTHIAZIDE 37.5; 25 MG/1; MG/1
1 TABLET ORAL DAILY
COMMUNITY

## 2022-02-17 NOTE — PROGRESS NOTES
2/16/22    [facility]  1950    Chief Complaint   Patient presents with    New Patient     dizziness and loss of balance, very tired all the time       History of Present Illness    Joseph Todd presents in neurologic consultation at our [facility] office for dizziness and balance issues. She states that she feels dizzy when she gets tired. She describes the dizziness as a disequilibrium like her balance feels off. The dizziness increases with bowel movements. She has been having increase in bowel movements after she was diagnosed with cirrhosis of the liver and hepatic encephalopathy and was started on rifaximin and lactulose. She has not had any passing out spells and denies any falls. She had an MRI of her brain performed on 7/22/2020 which was normal.  She states she has been following with gastroenterology for her liver disease. She denies any shortness of breath, chest pain or palpitations. She denies any rosana vertigo. She states she was having some confusion and disorientation but this was helped after initiation of rifaximin.       Subjective    Review of Symptoms:  Neurologic   Symptoms: confusion, dizziness, no difficulty with gait or walking, no bowel symptoms, no vertigo, no memory loss, no speech disorder, no visual loss, no double vision, no loss of hearing, no sensory disturbances, no weakness, no headaches, no bladder symptoms, no seizures, no excessive fatigue and no syncope    Current Outpatient Medications   Medication Sig Dispense Refill    MILK THISTLE PO Take by mouth 3 times daily      CALCIUM MAGNESIUM ZINC PO Take by mouth 2 times daily      dexlansoprazole (DEXILANT) 60 MG CPDR delayed release capsule Take 60 mg by mouth daily      rifaximin (XIFAXAN) 550 MG tablet Take 550 mg by mouth 2 times daily      triamterene-hydroCHLOROthiazide (MAXZIDE-25) 37.5-25 MG per tablet Take 1 tablet by mouth daily      nadolol (CORGARD) 20 MG tablet Take 20 mg by mouth daily      mirabegron (MYRBETRIQ) 25 MG TB24 Take 25 mg by mouth daily      vitamin B-6 (PYRIDOXINE) 100 MG tablet Take 100 mg by mouth daily      lactulose encephalopathy (ENULOSE) 10 GM/15ML SOLN solution Take 20 g by mouth 2 times daily      CALCIUM-MAG-VIT C-VIT D PO   Take 1 tablet by mouth 2 times daily ZINC ALSO included      dicyclomine (BENTYL) 10 MG capsule Take 20 mg by mouth 3 times daily       Rosuvastatin Calcium (CRESTOR PO) Take 5 mg by mouth daily.  raloxifene (EVISTA) 60 MG tablet Take 60 mg by mouth daily. No current facility-administered medications for this visit. Past Medical History:   Diagnosis Date    Cancer [facility]) 1997    right Breast    GERD (gastroesophageal reflux disease)     Hyperlipidemia     Osteoarthritis        Past Surgical History:   Procedure Laterality Date    BREAST SURGERY  5/12/97    right mastectomy    BREAST SURGERY      biopsy x3    HYSTERECTOMY  1987    ROTATOR CUFF REPAIR  12/14    Right    SHOULDER SURGERY  9/23/97    right         Social History     Socioeconomic History    Marital status:      Spouse name: hudson    Number of children: 2    Years of education: Not on file    Highest education level: Not on file   Occupational History    Occupation: retired   Tobacco Use    Smoking status: Never Smoker    Smokeless tobacco: Never Used   Substance and Sexual Activity    Alcohol use: No    Drug use: No    Sexual activity: Not on file   Other Topics Concern    Not on file   Social History Narrative    Caffeine Intake? 1 cup daily    Do you donate blood or plasma? no    Is a blood transfusion acceptable in an emergency? Yes    Advance directive?  Yes                     Social Determinants of Health     Financial Resource Strain:     Difficulty of Paying Living Expenses: Not on file   Food Insecurity:     Worried About Running Out of Food in the Last Year: Not on file    Anselmo of Food in the Last Year: Not on EVELYN Nair Needs:     Lack of Transportation (Medical): Not on file    Lack of Transportation (Non-Medical):  Not on file   Physical Activity:     Days of Exercise per Week: Not on file    Minutes of Exercise per Session: Not on file   Stress:     Feeling of Stress : Not on file   Social Connections:     Frequency of Communication with Friends and Family: Not on file    Frequency of Social Gatherings with Friends and Family: Not on file    Attends Hinduism Services: Not on file    Active Member of [address] [place] [address] or Organizations: Not on file    Attends Club or Organization Meetings: Not on file    Marital Status: Not on file   Intimate Partner Violence:     Fear of Current or Ex-Partner: Not on file    Emotionally Abused: Not on file    Physically Abused: Not on file    Sexually Abused: Not on file   Housing Stability:     Unable to Pay for Housing in the Last Year: Not on file    Number of [place] in the Last Year: Not on file    Unstable Housing in the Last Year: Not on file       Family History   Problem Relation Age of Onset    Alzheimer's Disease Mother     Cancer Father         throat    Heart Surgery Brother     Heart Disease Brother     Cancer Paternal Grandfather         colon       Objective    Physical Exam:    Constitutional   Weight: well nourished  Heart/Vascular   Rate and Rhythm: RRR   Murmurs: none   Arterial Pulses:  no carotid bruits  Neck   Appearance/Palpation/Auscultation: supple  Mental Status   Orientation: oriented to person, oriented to place, oriented to problem and oriented to time   Mood/Affect: appropriate mood and appropriate affect   Memory/Other: recent memory intact, remote memory intact, fund of knowledge intact, attention span normal and concentration normal  Language   Language: (normal) language, no dysarthria, (normal) articulation and no dysphasia/aphasia  Cranial Nerves   CN II Right: visual fields appear intact   CN II Left: visual fields appear intact   CN III, IV, VI: EOM no nystagmus, normal pursuit and extraocular muscle strength normal   CN III: pupil normal size, pupil reactive to light and dark, pupil accomodates and no ptosis   CN IV: normal   CN VI: normal   CN V Right: normal sensation and muscles of mastication intact   CN V Left: normal sensation and muscles of mastication intact   CN VII Right: normal facial expression   CN VII Left: normal facial expression   CN VIII Right: hearing in tact to normal conversation   CN VIII Left: hearing in tact to normal conversation   CN IX,X: normal palatal movement   CN XI Right: normal sternocleidomastoid and normal trapezius   CN XI Left: normal sternocleidomastoid and normal trapezius   CN XII: no tremors of the tongue, no fasciculation of the tongue, tongue protrudes midline, normal power to left and normal power to right  Gait and Stance   Gait/Posture: station normal, ambulates independently, gait normal and Romberg's test normal  Motor/Coordination Exam   General: no bradykinesia, no tremors, no chorea, no athetosis, no myoclonus and no dyskinesia   Right Upper Extremity: normal motor strength, normal bulk and normal tone   Left Upper Extremity: normal motor strength, normal bulk and normal tone   Right Lower Extremity: normal motor strength, normal bulk and normal tone   Left Lower Extremity: normal motor strength, normal bulk and normal tone   Coordination: no drift, normal heel-to-shin and rapid alternating movements normal  Reflexes   Reflexes Right: DTRS are normal throughout   Reflexes Left: DTRS are normal throughout  Sensory   Sensation: normal light touch, normal temperature, normal vibration and no neglect  Spine   Cervical Spine: no tenderness, no dystonia  and full ROM   Thoracic Spine: no spasms, no bony abnormalities, normal curvature, no tenderness and full ROM   Low Back: full ROM, no pain, no spasms and no bony abnormalities  Lungs   Auscultation: normal breath sounds  Skin   Inspection: no jaundice, no lesions, no rashes and no cyanosis      /72 (Site: Left Upper Arm, Position: Standing)   Pulse 58   Ht 5' 7\" (1.702 m)   Wt 165 lb (74.8 kg)   SpO2 98%   BMI 25.84 kg/m²     Assessment and Plan     Diagnosis Orders   1. Dizziness     2. [facility] encephalopathy ([facility]       Tae Valdovinos initially appeared to have a hepatic encephalopathy characterized by lethargy and disorientation as a manifestation of her liver disease. This has improved with treatment with rifaximin and lactulose. She continues to have some intermittent difficulty with her balance. Today she did quite well. She is not having any syncope or falls. She states that her \"dizziness\" increases with increased bowel movements with the treatment of rifaximin and lactulose. She may be having more dizziness with increased fluid loss with increased bowel movements. We did do orthostatic blood pressures in the office today and they were negative. We discussed staying well-hydrated and even wearing compression stockings to see if this may help decrease her dizziness sensation despite the negative orthostatic blood pressures. She will continue to follow with GI in regards to treatment of her liver disease. She will follow-up with us on an as-needed basis. If any further neurologic problems arise please do not hesitate to contact us. No follow-ups on file.     Efrain Asencio,

## 2023-01-11 ENCOUNTER — HOSPITAL ENCOUNTER (EMERGENCY)
Age: 73
Discharge: ANOTHER HOSPITAL | End: 2023-01-12
Attending: EMERGENCY MEDICINE
Payer: MEDICARE

## 2023-01-11 DIAGNOSIS — I85.11 ESOPHAGEAL VARICES WITH BLEEDING IN DISEASES CLASSIFIED ELSEWHERE (HCC): Primary | ICD-10-CM

## 2023-01-11 LAB
BASOPHILS ABSOLUTE: 0.1 K/CU MM
BASOPHILS RELATIVE PERCENT: 0.6 % (ref 0–1)
DIFFERENTIAL TYPE: ABNORMAL
EOSINOPHILS ABSOLUTE: 0.2 K/CU MM
EOSINOPHILS RELATIVE PERCENT: 1.8 % (ref 0–3)
HCT VFR BLD CALC: 26.4 % (ref 37–47)
HEMOGLOBIN: 8.7 GM/DL (ref 12.5–16)
IMMATURE NEUTROPHIL %: 0.5 % (ref 0–0.43)
LYMPHOCYTES ABSOLUTE: 2.5 K/CU MM
LYMPHOCYTES RELATIVE PERCENT: 30 % (ref 24–44)
MCH RBC QN AUTO: 33.1 PG (ref 27–31)
MCHC RBC AUTO-ENTMCNC: 33 % (ref 32–36)
MCV RBC AUTO: 100.4 FL (ref 78–100)
MONOCYTES ABSOLUTE: 1.2 K/CU MM
MONOCYTES RELATIVE PERCENT: 14.3 % (ref 0–4)
NUCLEATED RBC %: 0 %
PDW BLD-RTO: 15 % (ref 11.7–14.9)
PLATELET # BLD: 97 K/CU MM (ref 140–440)
PMV BLD AUTO: 11 FL (ref 7.5–11.1)
RBC # BLD: 2.63 M/CU MM (ref 4.2–5.4)
SEGMENTED NEUTROPHILS ABSOLUTE COUNT: 4.4 K/CU MM
SEGMENTED NEUTROPHILS RELATIVE PERCENT: 52.8 % (ref 36–66)
TOTAL IMMATURE NEUTOROPHIL: 0.04 K/CU MM
TOTAL NUCLEATED RBC: 0 K/CU MM
WBC # BLD: 8.3 K/CU MM (ref 4–10.5)

## 2023-01-11 PROCEDURE — 80053 COMPREHEN METABOLIC PANEL: CPT

## 2023-01-11 PROCEDURE — 85730 THROMBOPLASTIN TIME PARTIAL: CPT

## 2023-01-11 PROCEDURE — 86922 COMPATIBILITY TEST ANTIGLOB: CPT

## 2023-01-11 PROCEDURE — 86901 BLOOD TYPING SEROLOGIC RH(D): CPT

## 2023-01-11 PROCEDURE — 85025 COMPLETE CBC W/AUTO DIFF WBC: CPT

## 2023-01-11 PROCEDURE — 86900 BLOOD TYPING SEROLOGIC ABO: CPT

## 2023-01-11 PROCEDURE — 2500000003 HC RX 250 WO HCPCS: Performed by: EMERGENCY MEDICINE

## 2023-01-11 PROCEDURE — 96375 TX/PRO/DX INJ NEW DRUG ADDON: CPT

## 2023-01-11 PROCEDURE — 85610 PROTHROMBIN TIME: CPT

## 2023-01-11 PROCEDURE — C9113 INJ PANTOPRAZOLE SODIUM, VIA: HCPCS | Performed by: EMERGENCY MEDICINE

## 2023-01-11 PROCEDURE — 99285 EMERGENCY DEPT VISIT HI MDM: CPT

## 2023-01-11 PROCEDURE — 6360000002 HC RX W HCPCS: Performed by: EMERGENCY MEDICINE

## 2023-01-11 PROCEDURE — 2580000003 HC RX 258: Performed by: EMERGENCY MEDICINE

## 2023-01-11 PROCEDURE — 86850 RBC ANTIBODY SCREEN: CPT

## 2023-01-11 RX ORDER — PANTOPRAZOLE SODIUM 40 MG/10ML
80 INJECTION, POWDER, LYOPHILIZED, FOR SOLUTION INTRAVENOUS ONCE
Status: COMPLETED | OUTPATIENT
Start: 2023-01-11 | End: 2023-01-11

## 2023-01-11 RX ORDER — TRANEXAMIC ACID 10 MG/ML
1000 INJECTION, SOLUTION INTRAVENOUS ONCE
Status: COMPLETED | OUTPATIENT
Start: 2023-01-11 | End: 2023-01-12

## 2023-01-11 RX ORDER — OCTREOTIDE ACETATE 100 UG/ML
50 INJECTION, SOLUTION INTRAVENOUS; SUBCUTANEOUS ONCE
Status: COMPLETED | OUTPATIENT
Start: 2023-01-11 | End: 2023-01-12

## 2023-01-11 RX ORDER — SODIUM CHLORIDE 9 MG/ML
INJECTION, SOLUTION INTRAVENOUS PRN
Status: DISCONTINUED | OUTPATIENT
Start: 2023-01-11 | End: 2023-01-12 | Stop reason: HOSPADM

## 2023-01-11 RX ADMIN — TRANEXAMIC ACID 1000 MG: 10 INJECTION, SOLUTION INTRAVENOUS at 23:46

## 2023-01-11 RX ADMIN — PANTOPRAZOLE SODIUM 80 MG: 40 INJECTION, POWDER, FOR SOLUTION INTRAVENOUS at 23:47

## 2023-01-11 RX ADMIN — OCTREOTIDE ACETATE 50 MCG/HR: 500 INJECTION, SOLUTION INTRAVENOUS; SUBCUTANEOUS at 23:50

## 2023-01-11 ASSESSMENT — PAIN SCALES - GENERAL: PAINLEVEL_OUTOF10: 0

## 2023-01-12 ENCOUNTER — APPOINTMENT (OUTPATIENT)
Dept: CT IMAGING | Age: 73
End: 2023-01-12
Payer: MEDICARE

## 2023-01-12 VITALS
RESPIRATION RATE: 18 BRPM | BODY MASS INDEX: 22.76 KG/M2 | HEART RATE: 59 BPM | OXYGEN SATURATION: 97 % | WEIGHT: 145 LBS | TEMPERATURE: 97.5 F | SYSTOLIC BLOOD PRESSURE: 132 MMHG | DIASTOLIC BLOOD PRESSURE: 59 MMHG | HEIGHT: 67 IN

## 2023-01-12 LAB
ALBUMIN SERPL-MCNC: 2.8 GM/DL (ref 3.4–5)
ALP BLD-CCNC: 94 IU/L (ref 40–129)
ALT SERPL-CCNC: 26 U/L (ref 10–40)
ANION GAP SERPL CALCULATED.3IONS-SCNC: 5 MMOL/L (ref 4–16)
APTT: 34.1 SECONDS (ref 25.1–37.1)
AST SERPL-CCNC: 39 IU/L (ref 15–37)
BILIRUB SERPL-MCNC: 1 MG/DL (ref 0–1)
BUN BLDV-MCNC: 13 MG/DL (ref 6–23)
CALCIUM SERPL-MCNC: 7.8 MG/DL (ref 8.3–10.6)
CHLORIDE BLD-SCNC: 106 MMOL/L (ref 99–110)
CO2: 24 MMOL/L (ref 21–32)
CREAT SERPL-MCNC: 0.5 MG/DL (ref 0.6–1.1)
GFR SERPL CREATININE-BSD FRML MDRD: >60 ML/MIN/1.73M2
GLUCOSE BLD-MCNC: 99 MG/DL (ref 70–99)
INR BLD: 1.86 INDEX
POTASSIUM SERPL-SCNC: 5 MMOL/L (ref 3.5–5.1)
PROTHROMBIN TIME: 24.2 SECONDS (ref 11.7–14.5)
SODIUM BLD-SCNC: 135 MMOL/L (ref 135–145)
TOTAL PROTEIN: 4.7 GM/DL (ref 6.4–8.2)

## 2023-01-12 PROCEDURE — 6360000002 HC RX W HCPCS: Performed by: EMERGENCY MEDICINE

## 2023-01-12 PROCEDURE — 96368 THER/DIAG CONCURRENT INF: CPT

## 2023-01-12 PROCEDURE — 36430 TRANSFUSION BLD/BLD COMPNT: CPT

## 2023-01-12 PROCEDURE — 2580000003 HC RX 258: Performed by: EMERGENCY MEDICINE

## 2023-01-12 PROCEDURE — 72125 CT NECK SPINE W/O DYE: CPT

## 2023-01-12 PROCEDURE — 96365 THER/PROPH/DIAG IV INF INIT: CPT

## 2023-01-12 PROCEDURE — 6360000004 HC RX CONTRAST MEDICATION: Performed by: EMERGENCY MEDICINE

## 2023-01-12 PROCEDURE — 70450 CT HEAD/BRAIN W/O DYE: CPT

## 2023-01-12 PROCEDURE — 96376 TX/PRO/DX INJ SAME DRUG ADON: CPT

## 2023-01-12 PROCEDURE — 96366 THER/PROPH/DIAG IV INF ADDON: CPT

## 2023-01-12 PROCEDURE — 74174 CTA ABD&PLVS W/CONTRAST: CPT

## 2023-01-12 PROCEDURE — P9016 RBC LEUKOCYTES REDUCED: HCPCS

## 2023-01-12 RX ORDER — ONDANSETRON 2 MG/ML
4 INJECTION INTRAMUSCULAR; INTRAVENOUS EVERY 6 HOURS PRN
Status: DISCONTINUED | OUTPATIENT
Start: 2023-01-12 | End: 2023-01-12 | Stop reason: HOSPADM

## 2023-01-12 RX ADMIN — IOPAMIDOL 75 ML: 755 INJECTION, SOLUTION INTRAVENOUS at 01:16

## 2023-01-12 RX ADMIN — OCTREOTIDE ACETATE 50 MCG: 100 INJECTION, SOLUTION INTRAVENOUS; SUBCUTANEOUS at 01:49

## 2023-01-12 RX ADMIN — ONDANSETRON 4 MG: 2 INJECTION INTRAMUSCULAR; INTRAVENOUS at 00:53

## 2023-01-12 RX ADMIN — CEFTRIAXONE SODIUM 1000 MG: 1 INJECTION, POWDER, FOR SOLUTION INTRAMUSCULAR; INTRAVENOUS at 00:15

## 2023-01-12 NOTE — ED PROVIDER NOTES
Triage Chief Complaint:   Vomiting Blood    [place]:  Phillip Fu is a 74 y.o. female that presents with bright red blood per mouth and rectum. Patient was in baseline state of health until this evening when she was getting ready for bed and she rolled over vomiting about large amount of blood onto the floor. Patient then rolled out of bed. Patient was found immediately by her  and EMS was called. EMS does report patient with a history of esophageal varices status post banding recently at [facility].  does report that patient's doctor is Dr. 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 Patient is not on any anticoagulation. Patient reports that she was feeling fine up until this evening. Patient presently feels nauseous but otherwise denies any other symptoms. EMS does report patient was hypotensive to the 47W systolic and they establish an IV and started IV fluids with improvement to the low 100s. Family on arrival is requesting transfer to [facility] for further evaluation.     ROS:  General:  No fevers, no chills, no weakness  Eyes:  No recent vison changes, no discharge  ENT:  No sore throat, no nasal congestion, no hearing changes  Cardiovascular:  No chest pain, no palpitations  Respiratory:  No shortness of breath, no cough, no wheezing  Gastrointestinal:  No pain, + nausea, no vomiting, no diarrhea, + blood per mouth and rectum  Musculoskeletal:  No muscle pain, no joint pain  Skin:  No rash, no pruritis, no easy bruising  Neurologic:  No speech problems, no headache, no extremity numbness, no extremity tingling, no extremity weakness  Psychiatric:  No anxiety  Genitourinary:  No dysuria, no hematuria  Endocrine:  No unexpected weight gain, no unexpected weight loss  Extremities:  no edema, no pain    Past Medical History:   Diagnosis Date    Cancer ([facility] [address] 1997    right Breast    GERD (gastroesophageal reflux disease)     Hyperlipidemia     Osteoarthritis      Past Surgical History:   Procedure Laterality Date    BREAST SURGERY  5/12/97    right mastectomy    BREAST SURGERY      biopsy x3    HYSTERECTOMY  1987    ROTATOR CUFF REPAIR  12/14    Right    SHOULDER SURGERY  9/23/97    right      Family History   Problem Relation Age of Onset    Alzheimer's Disease Mother     Cancer Father         throat    Heart Surgery Brother     Heart Disease Brother     Cancer Paternal Grandfather         colon     Social History     Socioeconomic History    Marital status:      Spouse name: hudson    Number of children: 2    Years of education: Not on file    Highest education level: Not on file   Occupational History    Occupation: retired   Tobacco Use    Smoking status: Never    Smokeless tobacco: Never   Substance and Sexual Activity    Alcohol use: No    Drug use: No    Sexual activity: Not on file   Other Topics Concern    Not on file   Social History Narrative    Caffeine Intake? 1 cup daily    Do you donate blood or plasma? no    Is a blood transfusion acceptable in an emergency? Yes    Advance directive?  Yes                     Social Determinants of Health     Financial Resource Strain: Not on file   Food Insecurity: Not on file   Transportation Needs: Not on file   Physical Activity: Not on file   Stress: Not on file   Social Connections: Not on file   Intimate Partner Violence: Not on file   Housing Stability: Not on file     Current Facility-Administered Medications   Medication Dose Route Frequency Provider Last Rate Last Admin    ondansetron (ZOFRAN) injection 4 mg  4 mg IntraVENous Q6H PRN Sienna Lewis MD   4 mg at 01/12/23 0053    0.9 % sodium chloride infusion   IntraVENous PRN Sienna Lewis MD        octreotide (SANDOSTATIN) 500 mcg in sodium chloride 0.9 % 100 mL infusion  50 mcg/hr IntraVENous Continuous Sienna Lewis MD 10 mL/hr at 01/11/23 2350 50 mcg/hr at 01/11/23 2350     Current Outpatient Medications   Medication Sig Dispense Refill    MILK THISTLE PO Take by mouth 3 times daily      CALCIUM MAGNESIUM ZINC PO Take by mouth 2 times daily      dexlansoprazole (DEXILANT) 60 MG CPDR delayed release capsule Take 60 mg by mouth daily      rifaximin (XIFAXAN) 550 MG tablet Take 550 mg by mouth 2 times daily      triamterene-hydroCHLOROthiazide (MAXZIDE-25) 37.5-25 MG per tablet Take 1 tablet by mouth daily      nadolol (CORGARD) 20 MG tablet Take 20 mg by mouth daily      mirabegron (MYRBETRIQ) 25 MG TB24 Take 25 mg by mouth daily      vitamin B-6 (PYRIDOXINE) 100 MG tablet Take 100 mg by mouth daily      lactulose encephalopathy (ENULOSE) 10 GM/15ML SOLN solution Take 20 g by mouth 2 times daily      CALCIUM-MAG-VIT C-VIT D PO   Take 1 tablet by mouth 2 times daily ZINC ALSO included      dicyclomine (BENTYL) 10 MG capsule Take 20 mg by mouth 3 times daily       Rosuvastatin Calcium (CRESTOR PO) Take 5 mg by mouth daily. raloxifene (EVISTA) 60 MG tablet Take 60 mg by mouth daily. Allergies   Allergen Reactions    Demerol Hcl [Meperidine] Nausea Only       Nursing Notes Reviewed    Physical Exam:  ED Triage Vitals [01/11/23 2321]   Enc Vitals Group      BP 88/62      Heart Rate 60      Resp 13      Temp       Temp src       SpO2 98 %      Weight       Height       Head Circumference       Peak Flow       Pain Score       Pain Loc       Pain Edu? Excl. in [address]? My pulse ox interpretation is - normal    General appearance: Appears unwell. Covered in dried blood. Skin:  Warm. Dry. Slightly pale. Eye:  Extraocular movements intact. Ears, nose, mouth and throat: Small amounts of dried blood around oropharynx. Neck:  Trachea midline. Extremity:  No swelling. Normal ROM     Heart: Slightly bradycardic but regular, normal S1 & S2, no extra heart sounds. Perfusion:  Intact   Respiratory:  Lungs clear to auscultation bilaterally. Respirations nonlabored. Abdominal:  Normal bowel sounds. Soft. Completely nontender. Non distended.   Back:  No CVA tenderness to palpation     Neurological:  Alert and oriented times 3. No focal neuro deficits.              Psychiatric:  Appropriate    I have reviewed and interpreted all of the currently available lab results from this visit (if applicable):  Results for orders placed or performed during the hospital encounter of 01/11/23   Comprehensive Metabolic Panel   Result Value Ref Range    Sodium 135 135 - 145 MMOL/L    Potassium 5.0 3.5 - 5.1 MMOL/L    Chloride 106 99 - 110 mMol/L    CO2 24 21 - 32 MMOL/L    BUN 13 6 - 23 MG/DL    Creatinine 0.5 (L) 0.6 - 1.1 MG/DL    Est, Glom Filt Rate >60 >60 mL/min/1.73m2    Glucose 99 70 - 99 MG/DL    Calcium 7.8 (L) 8.3 - 10.6 MG/DL    Albumin 2.8 (L) 3.4 - 5.0 GM/DL    Total Protein 4.7 (L) 6.4 - 8.2 GM/DL    Total Bilirubin 1.0 0.0 - 1.0 MG/DL    ALT 26 10 - 40 U/L    AST 39 (H) 15 - 37 IU/L    Alkaline Phosphatase 94 40 - 129 IU/L    Anion Gap 5 4 - 16   CBC with Auto Differential   Result Value Ref Range    WBC 8.3 4.0 - 10.5 K/CU MM    RBC 2.63 (L) 4.2 - 5.4 M/CU MM    Hemoglobin 8.7 (L) 12.5 - 16.0 GM/DL    Hematocrit 26.4 (L) 37 - 47 %    .4 (H) 78 - 100 FL    MCH 33.1 (H) 27 - 31 PG    MCHC 33.0 32.0 - 36.0 %    RDW 15.0 (H) 11.7 - 14.9 %    Platelets 97 (L) 623 - 440 K/CU MM    MPV 11.0 7.5 - 11.1 FL    Differential Type AUTOMATED DIFFERENTIAL     Segs Relative 52.8 36 - 66 %    Lymphocytes % 30.0 24 - 44 %    Monocytes % 14.3 (H) 0 - 4 %    Eosinophils % 1.8 0 - 3 %    Basophils % 0.6 0 - 1 %    Segs Absolute 4.4 K/CU MM    Lymphocytes Absolute 2.5 K/CU MM    Monocytes Absolute 1.2 K/CU MM    Eosinophils Absolute 0.2 K/CU MM    Basophils Absolute 0.1 K/CU MM    Nucleated RBC % 0.0 %    Total Nucleated RBC 0.0 K/CU MM    Total Immature Neutrophil 0.04 K/CU MM    Immature Neutrophil % 0.5 (H) 0 - 0.43 %   Protime/INR & PTT   Result Value Ref Range    Protime 24.2 (H) 11.7 - 14.5 SECONDS    INR 1.86 INDEX    aPTT 34.1 25.1 - 37.1 SECONDS   TYPE AND SCREEN   Result Value Ref Range    ABO/Rh O POSITIVE     Antibody Screen NEGATIVE     Unit Number [id number]     Component LEUKO-POOR RED CELLS     Unit Divison 00     Status ALLOCATED     Transfusion Status OK TO TRANSFUSE     Crossmatch Result COMPATIBLE     Unit Number [id number]     Component LEUKO-POOR RED CELLS     Unit Divison 00     Status ISSUED     Transfusion Status OK TO TRANSFUSE     Crossmatch Result COMPATIBLE     Unit Number [id number]     Component LEUKO-POOR RED CELLS     Unit Divison 00     Status ALLOCATED     Transfusion Status OK TO TRANSFUSE     Crossmatch Result COMPATIBLE     Unit Number [id number]     Component LEUKO-POOR RED CELLS     Unit Divison 00     Status ALLOCATED     Transfusion Status OK TO TRANSFUSE     Crossmatch Result COMPATIBLE       Radiographs (if obtained):  [] The following radiograph was interpreted by myself in the absence of a radiologist:   [x] Radiologist's Report Reviewed:  CTA ABDOMEN PELVIS W WO CONTRAST   Final Result   1. Hyperemic gastric mucosa concerning for gastritis with possible ulceration   and subjacent varix along the lesser curvature of the stomach. No evidence   of active extravasation. 2. Sigmoid diverticulosis. 3. Cirrhosis with small amount of ascites and perigastric and periesophageal   varices. CT CERVICAL SPINE WO CONTRAST   Final Result   No acute abnormality of the cervical spine. CT HEAD WO CONTRAST   Final Result   No acute intracranial abnormality. EKG (if obtained): (All EKG's are interpreted by myself in the absence of a cardiologist)    Chart review shows recent radiographs:  No results found. MDM:  Pt presents as above. Emergent conditions considered. Presentation prompted initial labs. Additional large-bore IV access is obtained. Blood bank is called and 1 unit of emergency blood is ordered after visualizing EMS pictures and patient's hypotension.   No active bleeding on arrival but I do have concern for esophageal variceal bleed. GI is consulted. IV Protonix bolus and octreotide bolus and infusion are ordered. IV TXA ordered. IV Rocephin ordered. 1 unit of packed red blood cells is quickly given. I did speak with Dr. Eduar Anderson shortly after patient's arrival and he agrees with the above work-up and would like to be updated after hemoglobin comes back and is recommending ICU admission. On several rechecks earlier in ED course patient is without any further output and is with stable vital signs with blood pressure in the 455V systolic and heart rate in the 50s and 60s. Patient family is adamant that she be transferred to [facility] for further surgical evaluation as that is where all of her care has been which is reasonable. I did discuss the case with [facility] transfer center and she is accepted through the emergency department by Dr. Mckenna Bazan for expedited transfer. Unfortunately due to weather helicopters are not flying tonight and patient will need to go by critical care ground. I did discuss how quickly patient's status may change with patient and family and after shared decision-making regarding risks and benefits of transfer they would still like patient to be transferred for evaluation. Critical care ground transport is arranged for 2:00 this morning. CBC does resolved and does demonstrate a 4 g drop of hemoglobin from most recent check in November at [facility]. CMP is without clinically significant derangement. Additional 1 unit of packed red blood cells is set for transfusion. CT head and cervical spine obtained given the fall out of bed and is negative. CTA imaging per GI bleed protocol is pursued while awaiting critical care ground transport. CTA imaging is negative for active extravasation but does demonstrate hyperemic gastric mucosa concerning for gastritis with possible ulceration.   Additionally, patient's known cirrhosis with small amount of ascites and perigastric/periesophageal varices noted. Patient is with no further blood output throughout ED course while waiting for transport. Third unit of packed red blood cells is given to transport team for transfusion in transit. Questions sought and answered with the patient. They voice understanding and agree with plan. CC/HPI Summary, DDx, ED Course, and Reassessment: as above    History from : Patient, Family spouse and daughter, and EMS    Limitations to history : None    Patient was given the following medications:  Medications   0.9 % sodium chloride infusion (has no administration in time range)   octreotide (SANDOSTATIN) 500 mcg in sodium chloride 0.9 % 100 mL infusion (50 mcg/hr IntraVENous New Bag 1/11/23 2350)   ondansetron (ZOFRAN) injection 4 mg (4 mg IntraVENous Given 1/12/23 0053)   pantoprazole (PROTONIX) injection 80 mg (80 mg IntraVENous Given 1/11/23 2347)   tranexamic acid-NaCl IVPB premix 1,000 mg (0 mg IntraVENous Stopped 1/12/23 0001)   octreotide (SANDOSTATIN) injection 50 mcg (50 mcg IntraVENous Given 1/12/23 0149)   cefTRIAXone (ROCEPHIN) 1,000 mg in dextrose 5 % 50 mL IVPB mini-bag (0 mg IntraVENous Stopped 1/12/23 0045)   iopamidol (ISOVUE-370) 76 % injection 75 mL (75 mLs IntraVENous Given 1/12/23 0116)       Independent Imaging Interpretation by me: Patient CT head and CTA imaging of belly    Chronic conditions affecting care: Cirrhosis with esophageal varices    Discussion with Other Profesionals : Consultant gastroenterology as well as [facility] transfer center    Social Determinants : None    Records Reviewed : Inpatient Notes from gastroenterology and Outpatient Notes from endoscopy    Disposition Considerations (tests considered but not done, Shared Decision Making, Pt Expectation of Test or Tx.): Prolonged shared decision making regarding transfer to [facility] versus further observation intensive care unit here prior to transfer.   After discussion of risks and benefits decision made to transfer patient via critical care ground team with blood running. Patient and family are all aware of the possibility of decompensation during transit as I cannot predict the future however given patient's stability here and lack of active extravasation on CTA imaging transfer is pursued. I am the Primary Clinician of Record. Is this patient to be included in the SEP-1 Core Measure due to severe sepsis or septic shock? No   Exclusion criteria - the patient is NOT to be included for SEP-1 Core Measure due to: Infection is not suspected      CRITICAL CARE NOTE:  There was a high probability of clinically significant life-threatening deterioration of the patient's condition requiring my urgent intervention due to concern for variceal bleed. Aggressive management as above with numerous IV medications, packed red blood cell transfusion management, direct interpretation of CT imaging, outpatient record review, discussion with subspecialist, discussion with tertiary care team, direct report to the transfer team and numerous rechecks and telemetry monitoring with patient and family counseling was performed to address this. Total critical care time is 75 minutes. This includes vital sign monitoring, pulse oximetry monitoring, telemetry monitoring, clinical response to the IV medications, reviewing the nursing notes, consultation time, dictation/documentation time, and interpretation of the lab work. This time excludes time spent performing procedures and separately billable procedures and family discussion time. Care of this patient occurred during the COVID-19 pandemic. Clinical Impression:  1. Esophageal varices with bleeding in diseases classified elsewhere [facility])      Disposition referral (if applicable):  No follow-up provider specified.   Disposition medications (if applicable):  New Prescriptions    No medications on file       Comment: Please note this report has been produced using speech recognition software and may contain errors related to that system including errors in grammar, punctuation, and spelling, as well as words and phrases that may be inappropriate. If there are any questions or concerns please feel free to contact the dictating provider for clarification.         Cleopatra Turpin MD  01/12/23 0067

## 2023-01-12 NOTE — ED NOTES
Called [facility] and they stated they weren't flying   [facility] said they have no MICU crew available   [facility] was called and set up critical care transport for 0200-0230am     Melany Bermudez  01/12/23 0059

## 2023-01-12 NOTE — ED NOTES
Patient started on her second unit of matched blood before transport.       Gael Dominguez RN  01/12/23 0228

## 2023-01-12 NOTE — CONSENT
Informed Consent for Blood Component Transfusion Note    I have discussed with the patient the rationale for blood component transfusion; its benefits in treating or preventing fatigue, organ damage, or death; and its risk which includes mild transfusion reactions, rare risk of blood borne infection, or more serious but rare reactions. I have discussed the alternatives to transfusion, including the risk and consequences of not receiving transfusion. The patient had an opportunity to ask questions and had agreed to proceed with transfusion of blood components.     Electronically signed by Ruben Gay MD on 1/11/23 at 11:24 PM EST

## 2023-01-14 LAB
ABO/RH: NORMAL
ANTIBODY SCREEN: NEGATIVE
COMPONENT: NORMAL
CROSSMATCH RESULT: NORMAL
STATUS: NORMAL
TRANSFUSION STATUS: NORMAL
UNIT DIVISION: 0
UNIT NUMBER: NORMAL